# Patient Record
Sex: MALE | Race: WHITE | NOT HISPANIC OR LATINO | Employment: OTHER | URBAN - METROPOLITAN AREA
[De-identification: names, ages, dates, MRNs, and addresses within clinical notes are randomized per-mention and may not be internally consistent; named-entity substitution may affect disease eponyms.]

---

## 2017-04-10 ENCOUNTER — OFFICE VISIT (OUTPATIENT)
Dept: MEDICAL GROUP | Facility: MEDICAL CENTER | Age: 57
End: 2017-04-10
Payer: COMMERCIAL

## 2017-04-10 ENCOUNTER — HOSPITAL ENCOUNTER (OUTPATIENT)
Dept: RADIOLOGY | Facility: MEDICAL CENTER | Age: 57
End: 2017-04-10
Attending: NURSE PRACTITIONER
Payer: COMMERCIAL

## 2017-04-10 VITALS
HEART RATE: 75 BPM | OXYGEN SATURATION: 97 % | TEMPERATURE: 99.2 F | DIASTOLIC BLOOD PRESSURE: 70 MMHG | SYSTOLIC BLOOD PRESSURE: 114 MMHG | HEIGHT: 76 IN | RESPIRATION RATE: 16 BRPM | BODY MASS INDEX: 34.19 KG/M2 | WEIGHT: 280.8 LBS

## 2017-04-10 DIAGNOSIS — T14.8XXA NON-HEALING NON-SURGICAL WOUND: ICD-10-CM

## 2017-04-10 DIAGNOSIS — R22.2 MASS OF CHEST WALL: ICD-10-CM

## 2017-04-10 DIAGNOSIS — Z12.5 SCREENING FOR PROSTATE CANCER: ICD-10-CM

## 2017-04-10 DIAGNOSIS — Z12.11 SCREEN FOR COLON CANCER: ICD-10-CM

## 2017-04-10 DIAGNOSIS — R05.3 PERSISTENT COUGH: ICD-10-CM

## 2017-04-10 DIAGNOSIS — Z00.00 ROUTINE GENERAL MEDICAL EXAMINATION AT A HEALTH CARE FACILITY: ICD-10-CM

## 2017-04-10 PROCEDURE — 99213 OFFICE O/P EST LOW 20 MIN: CPT | Performed by: NURSE PRACTITIONER

## 2017-04-10 PROCEDURE — 71020 DX-CHEST-2 VIEWS: CPT

## 2017-04-10 ASSESSMENT — PATIENT HEALTH QUESTIONNAIRE - PHQ9: CLINICAL INTERPRETATION OF PHQ2 SCORE: 0

## 2017-04-10 NOTE — MR AVS SNAPSHOT
"        Jaden Cheung   4/10/2017 2:40 PM   Office Visit   MRN: [id number]    Department:  [address] [place] [address]   Dept Phone:  [phone]    Description:  Male : 1960   Provider:  BUDDY Mosquera           Reason for Visit     Sore on end of R. middle finger    Nodule on center of chest    Other \"constant clearing of throat, not drainage\"    Gland Swelling saliva gland swelling on L. side of face    Establish Care           Allergies as of 4/10/2017     No Known Allergies      You were diagnosed with     Non-healing non-surgical wound   [034393]       Mass of chest wall   [482401]       Persistent cough   [366222]       Routine general medical examination at a health care facility   [V70.0.ICD-9-CM]       Screen for colon cancer   [115119]       Screening for prostate cancer   [[id number]]         Vital Signs     Blood Pressure Pulse Temperature Respirations Height Weight    114/70 mmHg 75 37.3 °C (99.2 °F) 16 1.93 m (6' 3.98\") 127.37 kg (280 lb 12.8 oz)    Body Mass Index Oxygen Saturation Smoking Status             34.19 kg/m2 97% Never Smoker          Basic Information     Date Of Birth Sex Race Ethnicity Preferred Language    1960 Male White Non- English      Health Maintenance        Date Due Completion Dates    IMM DTaP/Tdap/Td Vaccine (1 - Tdap) 1978 ---    COLONOSCOPY 2009 ---            Current Immunizations     No immunizations on file.      Below and/or attached are the medications your provider expects you to take. Review all of your home medications and newly ordered medications with your provider and/or pharmacist. Follow medication instructions as directed by your provider and/or pharmacist. Please keep your medication list with you and share with your provider. Update the information when medications are discontinued, doses are changed, or new medications (including over-the-counter products) are added; and carry medication information at all times in the event " of emergency situations     Allergies:  No Known Allergies          Medications  Valid as of: April 10, 2017 -  4:06 PM    Generic Name Brand Name Tablet Size Instructions for use    Amoxicillin (Cap) AMOXIL 500 MG Take 1 Cap by mouth 3 times a day.        .                 Medicines prescribed today were sent to:     [facility] [address] [place] [address] [place], [place] - [address] AT [facility] [address]    [address] [facility] [place] [address]    Phone: [phone] Fax: [phone]    Open 24 Hours?: No      Medication refill instructions:       If your prescription bottle indicates you have medication refills left, it is not necessary to call your provider’s office. Please contact your pharmacy and they will refill your medication.    If your prescription bottle indicates you do not have any refills left, you may request refills at any time through one of the following ways: The online Wholeshare system (except Urgent Care), by calling your provider’s office, or by asking your pharmacy to contact your provider’s office with a refill request. Medication refills are processed only during regular business hours and may not be available until the next business day. Your provider may request additional information or to have a follow-up visit with you prior to refilling your medication.   *Please Note: Medication refills are assigned a new Rx number when refilled electronically. Your pharmacy may indicate that no refills were authorized even though a new prescription for the same medication is available at the pharmacy. Please request the medicine by name with the pharmacy before contacting your provider for a refill.        Your To Do List     Future Labs/Procedures Complete By Expires    COMP METABOLIC PANEL  As directed 4/11/2018    DX-CHEST-2 VIEWS  As directed 10/11/2017    LIPID PROFILE  As directed 4/11/2018    PROSTATE SPECIFIC AG SCREENING  As directed 4/11/2018    TSH  As directed 4/11/2018     US-CHEST  As directed 4/10/2018      Referral     A referral request has been sent to our patient care coordination department. Please allow 3-5 business days for us to process this request and contact you either by phone or mail. If you do not hear from us by the 5th business day, please call us at [phone].           ShowUhow Access Code: [email or web address]  Expires: 5/10/2017  3:12 PM    ShowUhow  A secure, online tool to manage your health information     [employer]’s ShowUhow® is a secure, online tool that connects you to your personalized health information from the privacy of your home -- day or night - making it very easy for you to manage your healthcare. Once the activation process is completed, you can even access your medical information using the ShowUhow evelyn, which is available for free in the Apple Evelyn store or Google Play store.     ShowUhow provides the following levels of access (as shown below):   My Chart Features   [facility] Primary Care Doctor [facility]  Specialists [facility]  [facility]  Primary Care  Doctor   Email your healthcare team securely and privately 24/7 X X X    Manage appointments: schedule your next appointment; view details of past/upcoming appointments X      Request prescription refills. X      View recent personal medical records, including lab and immunizations X X X X   View health record, including health history, allergies, medications X X X X   Read reports about your outpatient visits, procedures, consult and ER notes X X X X   See your discharge summary, which is a recap of your hospital and/or ER visit that includes your diagnosis, lab results, and care plan. X X       How to register for ShowUhow:  1. Go to  [email or web address].  2. Click on the Sign Up Now box, which takes you to the New Member Sign Up page. You will need to provide the following information:  a. Enter your ShowUhow Access Code exactly as it appears at the top of this page. (You will not  need to use this code after you’ve completed the sign-up process. If you do not sign up before the expiration date, you must request a new code.)   b. Enter your date of birth.   c. Enter your home email address.   d. Click Submit, and follow the next screen’s instructions.  3. Create a [employer] USt ID. This will be your [employer] USt login ID and cannot be changed, so think of one that is secure and easy to remember.  4. Create a SourceNinja password. You can change your password at any time.  5. Enter your Password Reset Question and Answer. This can be used at a later time if you forget your password.   6. Enter your e-mail address. This allows you to receive e-mail notifications when new information is available in SourceNinja.  7. Click Sign Up. You can now view your health information.    For assistance activating your SourceNinja account, call [phone]

## 2017-04-11 ASSESSMENT — ENCOUNTER SYMPTOMS: COUGH: 1

## 2017-04-11 NOTE — PROGRESS NOTES
Subjective:      Jaden Cheung is a 56 y.o. male who presents with Sore; Nodule; Other; Gland Swelling; and Establish Care            Other  Associated symptoms include coughing and a rash.   Jaden Cheung is here today to establish care and for a number of concerns.      1. Non-healing non-surgical wound  In June of last year patient states he noticed a swelling on the tip of his right third finger. He lanced it and he states a ball-like material came out of the area. He was seen at urgent care and placed on antibiotics. He states since then and continues to not heal. He reports that he went to [facility] emergency room for this and x-rays were taken and everything was fine. No medications were prescribed. He is wondering why this is not healed and almost a year. There is minimal pain and there is small amounts of discharge from the site.    2. Mass of chest wall  Patient has a cystlike area on his left chest wall which has been present for a while but he would like to find out what this is and whether it should be removed. There is no pain or redness in the area.    3. Persistent cough  Patient reports he is persistently clearing his throat and feels that he has much phlegm which comes from his lungs. He states this is been going on for quite a while. He does not think it is sinus related so has not tried steroid nasal sprays or antihistamines. He denies history of asthma or emphysema and does not smoke.    4. Routine general medical examination at a health care facility  Patient due for routine blood work.    5. Screen for colon cancer  Patient reports he has never had a colonoscopy.    6. Screening for prostate cancer  Patient does not remember when he had his last PSA.    Current Outpatient Prescriptions   Medication Sig Dispense Refill   • amoxicillin (AMOXIL) 500 MG Cap Take 1 Cap by mouth 3 times a day. (Patient not taking: Reported on 4/10/2017) 30 Cap 0     No current  "facility-administered medications for this visit.     Social History   Substance Use Topics   • Smoking status: Never Smoker    • Smokeless tobacco: Never Used   • Alcohol Use: No   History reviewed. No pertinent past medical history.   Family History   Problem Relation Age of Onset   • Dementia Mother    • Other Father      parkinsons       Review of Systems   Respiratory: Positive for cough.    Skin: Positive for rash.   All other systems reviewed and are negative.         Objective:     /70 mmHg  Pulse 75  Temp(Src) 37.3 °C (99.2 °F)  Resp 16  Ht 1.93 m (6' 3.98\")  Wt 127.37 kg (280 lb 12.8 oz)  BMI 34.19 kg/m2  SpO2 97%     Physical Exam   Constitutional: He is oriented to person, place, and time. He appears well-developed and well-nourished. No distress.   HENT:   Head: Normocephalic and atraumatic.   Right Ear: External ear normal.   Left Ear: External ear normal.   Nose: Nose normal.   Mouth/Throat: Oropharynx is clear and moist.   Eyes: Conjunctivae are normal. Right eye exhibits no discharge. Left eye exhibits no discharge.   Neck: Normal range of motion. Neck supple. No tracheal deviation present. No thyromegaly present.   Cardiovascular: Normal rate, regular rhythm and normal heart sounds.    No murmur heard.  Pulmonary/Chest: Effort normal and breath sounds normal. No respiratory distress. He has no wheezes. He has no rales.   No congestion of the lungs heard.   Lymphadenopathy:     He has no cervical adenopathy.   Neurological: He is alert and oriented to person, place, and time. Coordination normal.   Skin: Skin is warm and dry. Rash noted. He is not diaphoretic. No erythema.   There is a raised an open area on the tip of the right third finger with scant discharge. There is a nontender nodular area beneath the skin of the left chest wall.   Psychiatric: He has a normal mood and affect. His behavior is normal. Judgment and thought content normal.   Nursing note and vitals reviewed.       "        Assessment/Plan:     1. Non-healing non-surgical wound  I am not sure why this is not healing but it has been present long enough that it needs further evaluation. I have referred patient to plastic surgery. Patient states that if it takes a long time to get in to a specialist, he does have the option of going to [facility] in [place] with his insurance and I told him that would be fine as well but this should be checked out in the next month. He should avoid rubbing the area which prevents it from healing.  - REFERRAL TO PLASTIC SURGERY    2. Mass of chest wall  Most likely lipoma but I will send patient for ultrasound per his request and if it needs be removed will refer him to a surgeon.  - US-CHEST; Future    3. Persistent cough  Patient's chest x-ray comes back normal at the time of dictation and I advised patient at the time of this visit that if it was normal, he should consider postnasal drip as a cause of his symptoms. I recommended steroid nasal sprays and antihistamines. If it does not improve I recommended that we do pulmonary function studies.  - DX-CHEST-2 VIEWS; Future    4. Routine general medical examination at a health care facility  Patient will do lab work and follow up if anything is abnormal.  - COMP METABOLIC PANEL; Future  - LIPID PROFILE; Future  - TSH; Future    5. Screen for colon cancer    - REFERRAL TO GASTROENTEROLOGY    6. Screening for prostate cancer    - PROSTATE SPECIFIC AG SCREENING; Future

## 2017-05-10 ENCOUNTER — HOSPITAL ENCOUNTER (OUTPATIENT)
Dept: LAB | Facility: MEDICAL CENTER | Age: 57
End: 2017-05-10
Attending: DERMATOLOGY
Payer: COMMERCIAL

## 2017-05-10 LAB
ALBUMIN SERPL BCP-MCNC: 4.4 G/DL (ref 3.2–4.9)
ALBUMIN/GLOB SERPL: 1.6 G/DL
ALP SERPL-CCNC: 77 U/L (ref 30–99)
ALT SERPL-CCNC: 31 U/L (ref 2–50)
ANION GAP SERPL CALC-SCNC: 13 MMOL/L (ref 0–11.9)
AST SERPL-CCNC: 33 U/L (ref 12–45)
BASOPHILS # BLD AUTO: 1.2 % (ref 0–1.8)
BASOPHILS # BLD: 0.06 K/UL (ref 0–0.12)
BILIRUB SERPL-MCNC: 1.4 MG/DL (ref 0.1–1.5)
BUN SERPL-MCNC: 17 MG/DL (ref 8–22)
CALCIUM SERPL-MCNC: 9.2 MG/DL (ref 8.5–10.5)
CHLORIDE SERPL-SCNC: 107 MMOL/L (ref 96–112)
CO2 SERPL-SCNC: 19 MMOL/L (ref 20–33)
CREAT SERPL-MCNC: 1.01 MG/DL (ref 0.5–1.4)
EOSINOPHIL # BLD AUTO: 0.07 K/UL (ref 0–0.51)
EOSINOPHIL NFR BLD: 1.3 % (ref 0–6.9)
ERYTHROCYTE [DISTWIDTH] IN BLOOD BY AUTOMATED COUNT: 39.7 FL (ref 35.9–50)
GFR SERPL CREATININE-BSD FRML MDRD: >60 ML/MIN/1.73 M 2
GLOBULIN SER CALC-MCNC: 2.8 G/DL (ref 1.9–3.5)
GLUCOSE SERPL-MCNC: 121 MG/DL (ref 65–99)
HCT VFR BLD AUTO: 47.7 % (ref 42–52)
HGB BLD-MCNC: 16.2 G/DL (ref 14–18)
IMM GRANULOCYTES # BLD AUTO: 0.02 K/UL (ref 0–0.11)
IMM GRANULOCYTES NFR BLD AUTO: 0.4 % (ref 0–0.9)
LYMPHOCYTES # BLD AUTO: 1.27 K/UL (ref 1–4.8)
LYMPHOCYTES NFR BLD: 24.4 % (ref 22–41)
MCH RBC QN AUTO: 29.5 PG (ref 27–33)
MCHC RBC AUTO-ENTMCNC: 34 G/DL (ref 33.7–35.3)
MCV RBC AUTO: 86.7 FL (ref 81.4–97.8)
MONOCYTES # BLD AUTO: 0.39 K/UL (ref 0–0.85)
MONOCYTES NFR BLD AUTO: 7.5 % (ref 0–13.4)
NEUTROPHILS # BLD AUTO: 3.39 K/UL (ref 1.82–7.42)
NEUTROPHILS NFR BLD: 65.2 % (ref 44–72)
NRBC # BLD AUTO: 0 K/UL
NRBC BLD AUTO-RTO: 0 /100 WBC
PLATELET # BLD AUTO: 232 K/UL (ref 164–446)
PMV BLD AUTO: 9.6 FL (ref 9–12.9)
POTASSIUM SERPL-SCNC: 4.3 MMOL/L (ref 3.6–5.5)
PROT SERPL-MCNC: 7.2 G/DL (ref 6–8.2)
RBC # BLD AUTO: 5.5 M/UL (ref 4.7–6.1)
SODIUM SERPL-SCNC: 139 MMOL/L (ref 135–145)
WBC # BLD AUTO: 5.2 K/UL (ref 4.8–10.8)

## 2017-05-10 PROCEDURE — 80053 COMPREHEN METABOLIC PANEL: CPT

## 2017-05-10 PROCEDURE — 85025 COMPLETE CBC W/AUTO DIFF WBC: CPT

## 2017-05-10 PROCEDURE — 36415 COLL VENOUS BLD VENIPUNCTURE: CPT

## 2017-08-05 ENCOUNTER — OFFICE VISIT (OUTPATIENT)
Dept: URGENT CARE | Facility: PHYSICIAN GROUP | Age: 57
End: 2017-08-05
Payer: COMMERCIAL

## 2017-08-05 VITALS
DIASTOLIC BLOOD PRESSURE: 76 MMHG | OXYGEN SATURATION: 94 % | TEMPERATURE: 98 F | HEART RATE: 78 BPM | BODY MASS INDEX: 35.49 KG/M2 | HEIGHT: 75 IN | RESPIRATION RATE: 12 BRPM | WEIGHT: 285.4 LBS | SYSTOLIC BLOOD PRESSURE: 110 MMHG

## 2017-08-05 DIAGNOSIS — L08.9 FINGER INFECTION: ICD-10-CM

## 2017-08-05 PROCEDURE — 99214 OFFICE O/P EST MOD 30 MIN: CPT | Performed by: FAMILY MEDICINE

## 2017-08-05 RX ORDER — DOXYCYCLINE HYCLATE 100 MG
TABLET ORAL
Qty: 20 TAB | Refills: 0 | Status: SHIPPED | OUTPATIENT
Start: 2017-08-05 | End: 2017-10-03

## 2017-08-05 RX ORDER — DOXYCYCLINE HYCLATE 100 MG/1
CAPSULE ORAL
Refills: 0 | COMMUNITY
Start: 2017-05-16 | End: 2017-08-05

## 2017-08-05 RX ORDER — HYDROCODONE BITARTRATE AND ACETAMINOPHEN 10; 325 MG/1; MG/1
TABLET ORAL
COMMUNITY
Start: 2017-07-13 | End: 2017-08-05

## 2017-08-05 ASSESSMENT — PAIN SCALES - GENERAL: PAINLEVEL: NO PAIN

## 2017-08-05 NOTE — PROGRESS NOTES
Chief Complaint:    Chief Complaint   Patient presents with   • Finger Pain     x 2 days / RT mid finger / poss infeciton       History of Present Illness:    This is a new problem. Has redness and pain right 3rd finger. 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 Has sutures in this finger. Will see [facility] for follow-up and suture removal on 8/7/17. Recently the skin proximal to the nail is getting red and tender. There was mild drainage from suture region. Doxycycline has worked/been tolerated in past.      Review of Systems:    Constitutional: Negative for fever, chills, and diaphoresis.   Eyes: Negative for change in vision, photophobia, pain, redness, and discharge.  ENT: Negative for ear pain, ear discharge, hearing loss, tinnitus, nasal congestion, nosebleeds, and sore throat.    Respiratory: Negative for cough, hemoptysis, sputum production, shortness of breath, wheezing, and stridor.    Cardiovascular: Negative for chest pain, palpitations, orthopnea, claudication, leg swelling, and PND.   Gastrointestinal: Negative for abdominal pain, nausea, vomiting, diarrhea, constipation, blood in stool, and melena.   Genitourinary: Negative for dysuria, urinary urgency, urinary frequency, hematuria, and flank pain.   Musculoskeletal: See HPI.  Skin: See HPI.  Neurological: Negative for dizziness, tingling, tremors, sensory change, speech change, focal weakness, seizures, loss of consciousness, and headaches.   Endo: Negative for polydipsia.   Heme: Does not bruise/bleed easily.   Psychiatric/Behavioral: Negative for depression, suicidal ideas, hallucinations, memory loss and substance abuse. The patient is not nervous/anxious and does not have insomnia.      Past Medical History:    History reviewed. No pertinent past medical history.    Past Surgical History:    Past Surgical History   Procedure Laterality Date   • Ankle orif         Social History:    Social History     Social History   • Marital  "Status: Single     Spouse Name: N/A   • Number of Children: N/A   • Years of Education: N/A     Occupational History   • Not on file.     Social History Main Topics   • Smoking status: Never Smoker    • Smokeless tobacco: Never Used   • Alcohol Use: No   • Drug Use: No   • Sexual Activity:     Partners: Female     Other Topics Concern   • Not on file     Social History Narrative       Family History:    Family History   Problem Relation Age of Onset   • Dementia Mother    • Other Father      parkinsons       Medications:    No current outpatient prescriptions on file prior to visit.     No current facility-administered medications on file prior to visit.       Allergies:    No Known Allergies      Vitals:    Filed Vitals:    08/05/17 1309   BP: 110/76   Pulse: 78   Temp: 36.7 °C (98 °F)   Resp: 12   Height: 1.905 m (6' 3\")   Weight: 129.457 kg (285 lb 6.4 oz)   SpO2: 94%       Physical Exam:    Constitutional: Vital signs reviewed. Appears well-developed and well-nourished. No acute distress.   Eyes: Sclera white, conjunctivae clear.  ENT: External ears normal. Hearing normal.  Cardiovascular: Peripheral pulses 2+.   Pulmonary/Chest: Respirations non-labored.  Musculoskeletal: Normal gait. Normal range of motion. No muscular atrophy or weakness.  Neurological: Alert and oriented to person, place, and time. Muscle tone normal. Coordination normal. Light touch and sensation normal.  Skin: Right 3rd finger: proximal to nail there is 1 suture with surrounding erythema and tenderness of skin. No obvious pus seen or felt. Distal to nail there are multiple sutures without signs of infection.  Psychiatric: Normal mood and affect. Behavior is normal. Judgment and thought content normal.       Assessment / Plan:    1. Finger infection  - doxycycline (VIBRAMYCIN) 100 MG Tab; 1 TAB TWICE A DAY X 10 DAYS.  Dispense: 20 Tab; Refill: 0      Discussed with him DDX and management options.    Agreeable to medication prescribed.    He " will follow-up at [facility] as scheduled on 8/7/17.    Return to urgent care if getting worse before 8/7/17.

## 2017-08-05 NOTE — MR AVS SNAPSHOT
"        Jaden Cheung   2017 1:00 PM   Office Visit   MRN: [id number]    Department:  [facility]   Dept Phone:  [phone]    Description:  Male : 1960   Provider:  Eleazar Malave M.D.           Reason for Visit     Finger Pain x 2 days / RT mid finger / poss infeciton      Allergies as of 2017     No Known Allergies      You were diagnosed with     Finger infection   [183000]         Vital Signs     Blood Pressure Pulse Temperature Respirations Height Weight    110/76 mmHg 78 36.7 °C (98 °F) 12 1.905 m (6' 3\") 129.457 kg (285 lb 6.4 oz)    Body Mass Index Oxygen Saturation Smoking Status             35.67 kg/m2 94% Never Smoker          Basic Information     Date Of Birth Sex Race Ethnicity Preferred Language    1960 Male White Non- English      Health Maintenance        Date Due Completion Dates    IMM DTaP/Tdap/Td Vaccine (1 - Tdap) 1978 ---    COLONOSCOPY 2009 ---    IMM INFLUENZA (1) 2017 ---            Current Immunizations     No immunizations on file.      Below and/or attached are the medications your provider expects you to take. Review all of your home medications and newly ordered medications with your provider and/or pharmacist. Follow medication instructions as directed by your provider and/or pharmacist. Please keep your medication list with you and share with your provider. Update the information when medications are discontinued, doses are changed, or new medications (including over-the-counter products) are added; and carry medication information at all times in the event of emergency situations     Allergies:  No Known Allergies          Medications  Valid as of: 2017 -  1:37 PM    Generic Name Brand Name Tablet Size Instructions for use    Doxycycline Hyclate (Tab) VIBRAMYCIN 100 MG 1 TAB TWICE A DAY X 10 DAYS.        Mupirocin (Ointment) BACTROBAN 2 %         .                 Medicines prescribed today were sent to:    " [id number] - [place], [place] - [address] AT [facility] [address]    [address] [place] [address]    Phone: [phone] Fax: [phone]    Open 24 Hours?: No      Medication refill instructions:       If your prescription bottle indicates you have medication refills left, it is not necessary to call your provider’s office. Please contact your pharmacy and they will refill your medication.    If your prescription bottle indicates you do not have any refills left, you may request refills at any time through one of the following ways: The online Open Places system (except Urgent Care), by calling your provider’s office, or by asking your pharmacy to contact your provider’s office with a refill request. Medication refills are processed only during regular business hours and may not be available until the next business day. Your provider may request additional information or to have a follow-up visit with you prior to refilling your medication.   *Please Note: Medication refills are assigned a new Rx number when refilled electronically. Your pharmacy may indicate that no refills were authorized even though a new prescription for the same medication is available at the pharmacy. Please request the medicine by name with the pharmacy before contacting your provider for a refill.           Open Places Access Code: Activation code not generated  Current Open Places Status: Active

## 2017-10-03 ENCOUNTER — OFFICE VISIT (OUTPATIENT)
Dept: MEDICAL GROUP | Facility: MEDICAL CENTER | Age: 57
End: 2017-10-03
Payer: COMMERCIAL

## 2017-10-03 VITALS
DIASTOLIC BLOOD PRESSURE: 80 MMHG | BODY MASS INDEX: 34.83 KG/M2 | OXYGEN SATURATION: 99 % | RESPIRATION RATE: 16 BRPM | HEIGHT: 76 IN | WEIGHT: 286 LBS | TEMPERATURE: 98.7 F | SYSTOLIC BLOOD PRESSURE: 132 MMHG | HEART RATE: 77 BPM

## 2017-10-03 DIAGNOSIS — D49.2 NEOPLASM OF SKIN OF FINGER: ICD-10-CM

## 2017-10-03 DIAGNOSIS — Z00.00 ROUTINE GENERAL MEDICAL EXAMINATION AT A HEALTH CARE FACILITY: ICD-10-CM

## 2017-10-03 DIAGNOSIS — Z12.5 SCREENING FOR PROSTATE CANCER: ICD-10-CM

## 2017-10-03 DIAGNOSIS — K42.9 UMBILICAL HERNIA WITHOUT OBSTRUCTION AND WITHOUT GANGRENE: ICD-10-CM

## 2017-10-03 DIAGNOSIS — R73.01 IMPAIRED FASTING GLUCOSE: ICD-10-CM

## 2017-10-03 DIAGNOSIS — Z12.11 SCREEN FOR COLON CANCER: ICD-10-CM

## 2017-10-03 DIAGNOSIS — Z23 INFLUENZA VACCINE NEEDED: ICD-10-CM

## 2017-10-03 PROCEDURE — 99214 OFFICE O/P EST MOD 30 MIN: CPT | Mod: 25 | Performed by: NURSE PRACTITIONER

## 2017-10-03 PROCEDURE — 90471 IMMUNIZATION ADMIN: CPT | Performed by: NURSE PRACTITIONER

## 2017-10-03 PROCEDURE — 90686 IIV4 VACC NO PRSV 0.5 ML IM: CPT | Performed by: NURSE PRACTITIONER

## 2017-10-03 NOTE — PROGRESS NOTES
"Subjective:      Jaden Cheung is a 57 y.o. male who presents with Abdominal Pain (Belly button hernia)            HPI Jaden CheungIs here today for hernia concerns and need of lab work and immunizations and referrals.    1. Umbilical hernia without obstruction and without gangrene  Patient reports that for a few months he has noticed a gradually growing \"hernia\" in his umbilical area. He states it does not cause pain but there is sometimes discomfort. He also wants to have this repaired because he feels it keeps him from working out. He is able to push the bulging in the abdomen. There has been no redness or tenderness of the area.    2. Impaired fasting glucose  Patient has no history of diabetes but it appears he had some lab work in May which showed a mild elevation of blood sugar 121. He denies diabetic symptoms.    3. Neoplasm of skin of finger  When patient was seen for his initial visit in April of this year he had a odd, nonhealing growth at the tip of his right third finger. He apparently had had this for some time. 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 He states he has a follow-up with them next month and they're going to do an MRI of the area because the margins may not have been totally removed. He reports no pain of the area.    4. BMI 34.0-34.9,adult  Patient admits he has trouble losing weight and would like to talk to a dietitian.    5. Influenza vaccine needed  Patient due for this season's influenza vaccine.    6. Screening for prostate cancer  Patient did not do lab work I ordered earlier this year and I explained about yearly blood work.    7. Screen for colon cancer  Patient states he had what appears to be a CT of the abdomen while at [facility] but has not had a colonoscopy.    8. Routine general medical examination at a health care facility  Patient due for lab work.    No current outpatient " "prescriptions on file.     No current facility-administered medications for this visit.      Social History   Substance Use Topics   • Smoking status: Never Smoker   • Smokeless tobacco: Never Used   • Alcohol use No     Family History   Problem Relation Age of Onset   • Dementia Mother    • Other Father      parkinsons   No past medical history on file.    Review of Systems   All other systems reviewed and are negative.         Objective:     /80   Pulse 77   Temp 37.1 °C (98.7 °F)   Resp 16   Ht 1.93 m (6' 4\")   Wt (!) 129.7 kg (286 lb)   SpO2 99%   BMI 34.81 kg/m²      Physical Exam   Constitutional: He is oriented to person, place, and time. He appears well-developed and well-nourished. No distress.   HENT:   Head: Normocephalic and atraumatic.   Right Ear: External ear normal.   Left Ear: External ear normal.   Nose: Nose normal.   Mouth/Throat: Oropharynx is clear and moist.   Eyes: Conjunctivae are normal. Right eye exhibits no discharge. Left eye exhibits no discharge.   Neck: Normal range of motion. Neck supple. No tracheal deviation present. No thyromegaly present.   Cardiovascular: Normal rate, regular rhythm and normal heart sounds.    No murmur heard.  Pulmonary/Chest: Effort normal and breath sounds normal. No respiratory distress. He has no wheezes. He has no rales.   Abdominal:     Small bulging on the superior area of the umbilicus which is reducible and nontender. No erythema present.   Lymphadenopathy:     He has no cervical adenopathy.   Neurological: He is alert and oriented to person, place, and time. Coordination normal.   Skin: Skin is warm and dry. No rash noted. He is not diaphoretic. No erythema.   Psychiatric: He has a normal mood and affect. His behavior is normal. Judgment and thought content normal.   Nursing note and vitals reviewed.              Assessment/Plan:     1. Impaired fasting glucose  Patient at risk for diabetes with his age and obesity so he will do lab work " and then I will have him follow up if abnormal.  - COMP METABOLIC PANEL; Future  - HEMOGLOBIN A1C; Future    2. Umbilical hernia without obstruction and without gangrene  This area is small but is concerning for patient because it keeps him from working out and he believes it is getting bigger so he asked to see a surgeon to discuss treatment options. Advised him to prevent constipation and splint the area when coughing or sneezing.  - REFERRAL TO GENERAL SURGERY    3. Neoplasm of skin of finger  Patient will be following up next month at [facility] and I told him to be sure that they send copies of notes on their findings.    4. BMI 34.0-34.9,adult    - Patient identified as having weight management issue.  Appropriate orders and counseling given.  - REFERRAL TO [facility] IMPROVEMENT PROGRAMS (HIP) Services Requested: Weight Management Program; Reason for Visit: Overweight/Obesity; Future    5. Influenza vaccine needed  I have placed the below orders and discussed them with an approved delegating provider. The MA is performing the below orders under the direction of Dr. Preston    - Flu Quad Inj >3 Year Pre-Filled PF    6. Screening for prostate cancer    - PROSTATE SPECIFIC AG SCREENING; Future    7. Screen for colon cancer    - REFERRAL TO GASTROENTEROLOGY    8. Routine general medical examination at a health care facility    - LIPID PROFILE; Future  - PROSTATE SPECIFIC AG SCREENING; Future  - COMP METABOLIC PANEL; Future

## 2017-11-14 DIAGNOSIS — R73.01 IMPAIRED FASTING GLUCOSE: ICD-10-CM

## 2018-04-03 ENCOUNTER — OFFICE VISIT (OUTPATIENT)
Dept: URGENT CARE | Facility: PHYSICIAN GROUP | Age: 58
End: 2018-04-03
Payer: COMMERCIAL

## 2018-04-03 VITALS
WEIGHT: 286 LBS | DIASTOLIC BLOOD PRESSURE: 76 MMHG | TEMPERATURE: 98.4 F | HEART RATE: 84 BPM | SYSTOLIC BLOOD PRESSURE: 104 MMHG | RESPIRATION RATE: 16 BRPM | OXYGEN SATURATION: 95 % | HEIGHT: 76 IN | BODY MASS INDEX: 34.83 KG/M2

## 2018-04-03 DIAGNOSIS — T14.8XXA PUNCTURE WOUND: ICD-10-CM

## 2018-04-03 PROCEDURE — 90471 IMMUNIZATION ADMIN: CPT | Performed by: PHYSICIAN ASSISTANT

## 2018-04-03 PROCEDURE — 90715 TDAP VACCINE 7 YRS/> IM: CPT | Performed by: PHYSICIAN ASSISTANT

## 2018-04-03 PROCEDURE — 99214 OFFICE O/P EST MOD 30 MIN: CPT | Mod: 25 | Performed by: PHYSICIAN ASSISTANT

## 2018-04-05 ASSESSMENT — ENCOUNTER SYMPTOMS
CARDIOVASCULAR NEGATIVE: 1
RESPIRATORY NEGATIVE: 1
CONSTITUTIONAL NEGATIVE: 1
GASTROINTESTINAL NEGATIVE: 1

## 2018-04-05 NOTE — PROGRESS NOTES
"Subjective:      Jaden Cheung is a 57 y.o. male who presents with Foot Problem (Rt side-stepped on nail )            Puncture Wound    The incident occurred 1 to 3 hours ago. The laceration is located on the right foot. The laceration is 1 cm in size. The laceration mechanism was a metal edge (dirty nail). The pain is at a severity of 0/10. The patient is experiencing no pain. The pain has been constant since onset. He reports no foreign bodies present. His tetanus status is out of date.   Superficial puncture wound. Here for tetanus booster.    PMH:  has no past medical history on file.  MEDS: No current outpatient prescriptions on file.  ALLERGIES: No Known Allergies  SURGHX:   Past Surgical History:   Procedure Laterality Date   • ANKLE ORIF       SOCHX:  reports that he has never smoked. He has never used smokeless tobacco. He reports that he does not drink alcohol or use drugs.  FH: family history includes Dementia in his mother; Other in his father.      Review of Systems   Constitutional: Negative.    Respiratory: Negative.    Cardiovascular: Negative.    Gastrointestinal: Negative.        Medications, Allergies, and current problem list reviewed today in Epic     Objective:     /76   Pulse 84   Temp 36.9 °C (98.4 °F)   Resp 16   Ht 1.93 m (6' 4\")   Wt (!) 129.7 kg (286 lb)   SpO2 95%   BMI 34.81 kg/m²      Physical Exam   Constitutional: He is oriented to person, place, and time. He appears well-developed and well-nourished. No distress.   HENT:   Head: Normocephalic and atraumatic.   Eyes: Conjunctivae are normal.   Neck: Normal range of motion. Neck supple.   Cardiovascular: Normal rate, regular rhythm and normal heart sounds.    No murmur heard.  Pulmonary/Chest: Effort normal and breath sounds normal. No respiratory distress. He has no wheezes.   Neurological: He is alert and oriented to person, place, and time.   Skin: Skin is warm and dry. He is not diaphoretic.   Superficial " puncture wound plantar surface right foot. No surrounding erythema, discharge, tenderness. Range of motion normal.   Psychiatric: He has a normal mood and affect. His behavior is normal. Judgment and thought content normal.   Nursing note and vitals reviewed.              Assessment/Plan:     1. Puncture wound  Tdap =>8yo IM     Superficial puncture wound. Exam unremarkable.  Wound care discussed, watch out for infection  Tetanus booster  Return to clinic or go to ED if symptoms worsen or persist. Indications for ED discussed at length. Patient voices understanding. Follow-up with your primary care provider in 3-5 days. Red flags discussed. All side effects of medication discussed including allergic response, GI upset, tendon injury, etc.    Please note that this dictation was created using voice recognition software. I have made every reasonable attempt to correct obvious errors, but I expect that there are errors of grammar and possibly content that I did not discover before finalizing the note.

## 2018-08-04 ENCOUNTER — OFFICE VISIT (OUTPATIENT)
Dept: URGENT CARE | Facility: PHYSICIAN GROUP | Age: 58
End: 2018-08-04
Payer: COMMERCIAL

## 2018-08-04 VITALS
BODY MASS INDEX: 33.49 KG/M2 | TEMPERATURE: 97.6 F | HEART RATE: 66 BPM | OXYGEN SATURATION: 94 % | SYSTOLIC BLOOD PRESSURE: 110 MMHG | HEIGHT: 76 IN | DIASTOLIC BLOOD PRESSURE: 70 MMHG | WEIGHT: 275 LBS

## 2018-08-04 DIAGNOSIS — H61.23 IMPACTED CERUMEN OF BOTH EARS: ICD-10-CM

## 2018-08-04 PROCEDURE — 99202 OFFICE O/P NEW SF 15 MIN: CPT | Performed by: EMERGENCY MEDICINE

## 2018-08-04 RX ORDER — HYDROCODONE BITARTRATE AND ACETAMINOPHEN 10; 325 MG/1; MG/1
TABLET ORAL
COMMUNITY
Start: 2017-07-13 | End: 2019-01-27

## 2018-08-04 ASSESSMENT — ENCOUNTER SYMPTOMS
HEADACHES: 0
FEVER: 0
COUGH: 0
VOMITING: 0
SORE THROAT: 0
SWOLLEN GLANDS: 0
NAUSEA: 0

## 2018-08-04 NOTE — PROGRESS NOTES
"Subjective:      Jaden Cheung is a 58 y.o. male who presents with Cerumen Impaction (Pt presents w/ bilateral cerumen impaction.)            Cerumen Impaction   This is a recurrent problem. The current episode started in the past 7 days. The problem occurs daily. The problem has been waxing and waning. Pertinent negatives include no congestion, coughing, fever, headaches, nausea, rash, sore throat, swollen glands or vomiting. Nothing aggravates the symptoms. Treatments tried: earwax cleaning kit. The treatment provided no relief.       Review of Systems   Constitutional: Negative for fever.   HENT: Negative for congestion, ear discharge, ear pain, sore throat and tinnitus.    Respiratory: Negative for cough.    Gastrointestinal: Negative for nausea and vomiting.   Skin: Negative for itching and rash.   Neurological: Negative for headaches.   Endo/Heme/Allergies: Negative for environmental allergies.     PMH:  has no past medical history on file.  MEDS:   Current Outpatient Prescriptions:   •  HYDROcodone/acetaminophen (NORCO)  MG Tab, Take  by mouth., Disp: , Rfl:   ALLERGIES: No Known Allergies  SURGHX:   Past Surgical History:   Procedure Laterality Date   • ANKLE ORIF       SOCHX:  reports that he has never smoked. He has never used smokeless tobacco. He reports that he does not drink alcohol or use drugs.  FH: family history includes Dementia in his mother; Other in his father.       Objective:     /70   Pulse 66   Temp 36.4 °C (97.6 °F)   Ht 1.93 m (6' 4\")   Wt 124.7 kg (275 lb)   SpO2 94%   BMI 33.47 kg/m²      Physical Exam   Constitutional: Vital signs are normal. He appears well-developed and well-nourished. He is cooperative. He does not appear ill. No distress.   HENT:   Head: Normocephalic.   Right Ear: External ear normal. No drainage, swelling or tenderness. No mastoid tenderness. Decreased hearing is noted.   Left Ear: External ear normal. No drainage, swelling or tenderness. " No mastoid tenderness. Decreased hearing is noted.   Nose: Nose normal.   Bilateral impacted cerumen; aftercare lavage ear canals clear, tympanic membranes normal.   Eyes: Conjunctivae are normal.   Pulmonary/Chest: Effort normal.   Lymphadenopathy:        Head (right side): No preauricular and no posterior auricular adenopathy present.        Head (left side): No preauricular and no posterior auricular adenopathy present.     He has no cervical adenopathy.   Neurological: He is alert.   Skin: Skin is warm and dry. No rash noted.   Psychiatric: He has a normal mood and affect.               Assessment/Plan:     1. Impacted cerumen of both ears  Ear lavages

## 2018-08-04 NOTE — PATIENT INSTRUCTIONS
Earwax Buildup  Your ears make a substance called earwax. It may also be called cerumen. Sometimes, too much earwax builds up in your ear canal. This can cause ear pain and make it harder for you to hear.  CAUSES  This condition is caused by too much earwax production or buildup.  RISK FACTORS  The following factors may make you more likely to develop this condition:  · Cleaning your ears often with swabs.  · Having narrow ear canals.  · Having earwax that is overly thick or sticky.  · Having eczema.  · Being dehydrated.  SYMPTOMS  Symptoms of this condition include:  · Reduced hearing.  · Ear drainage.  · Ear pain.  · Ear itch.  · A feeling of fullness in the ear or feeling that the ear is plugged.  · Ringing in the ear.  · Coughing.  DIAGNOSIS  Your health care provider can diagnose this condition based on your symptoms and medical history. Your health care provider will also do an ear exam to look inside your ear with a scope (otoscope). You may also have a hearing test.  TREATMENT  Treatment for this condition includes:  · Over-the-counter or prescription ear drops to soften the earwax.  · Earwax removal by a health care provider. This may be done:  ¨ By flushing the ear with body-temperature water.  ¨ With a medical instrument that has a loop at the end (earwax curette).  ¨ With a suction device.  HOME CARE INSTRUCTIONS  · Take over-the-counter and prescription medicines only as told by your health care provider.  · Do not put any objects, including an ear swab, into your ear. You can clean the opening of your ear canal with a washcloth.  · Drink enough water to keep your urine clear or pale yellow.  · If you have frequent earwax buildup or you use hearing aids, consider seeing your health care provider every 6-12 months for routine preventive ear cleanings. Keep all follow-up visits as told by your health care provider.  SEEK MEDICAL CARE IF:  · You have ear pain.  · Your condition does not improve with  treatment.  · You have hearing loss.  · You have blood, pus, or other fluid coming from your ear.  This information is not intended to replace advice given to you by your health care provider. Make sure you discuss any questions you have with your health care provider.  Document Released: 01/25/2006 Document Revised: 04/10/2017 Document Reviewed: 08/03/2016  [employer] Interactive Patient Education © 2017 [employer]

## 2019-01-27 ENCOUNTER — APPOINTMENT (OUTPATIENT)
Dept: RADIOLOGY | Facility: MEDICAL CENTER | Age: 59
End: 2019-01-27
Attending: EMERGENCY MEDICINE
Payer: COMMERCIAL

## 2019-01-27 ENCOUNTER — APPOINTMENT (OUTPATIENT)
Dept: RADIOLOGY | Facility: MEDICAL CENTER | Age: 59
End: 2019-01-27
Attending: FAMILY MEDICINE
Payer: COMMERCIAL

## 2019-01-27 ENCOUNTER — HOSPITAL ENCOUNTER (OUTPATIENT)
Facility: MEDICAL CENTER | Age: 59
End: 2019-01-27
Attending: EMERGENCY MEDICINE | Admitting: FAMILY MEDICINE
Payer: COMMERCIAL

## 2019-01-27 ENCOUNTER — APPOINTMENT (OUTPATIENT)
Dept: CARDIOLOGY | Facility: MEDICAL CENTER | Age: 59
End: 2019-01-27
Attending: FAMILY MEDICINE
Payer: COMMERCIAL

## 2019-01-27 VITALS
TEMPERATURE: 97.6 F | OXYGEN SATURATION: 94 % | BODY MASS INDEX: 33.75 KG/M2 | WEIGHT: 277.12 LBS | DIASTOLIC BLOOD PRESSURE: 67 MMHG | HEIGHT: 76 IN | SYSTOLIC BLOOD PRESSURE: 112 MMHG | HEART RATE: 63 BPM | RESPIRATION RATE: 14 BRPM

## 2019-01-27 DIAGNOSIS — R07.89 ATYPICAL CHEST PAIN: ICD-10-CM

## 2019-01-27 PROBLEM — E78.5 DYSLIPIDEMIA: Status: ACTIVE | Noted: 2019-01-27

## 2019-01-27 PROBLEM — R07.9 CHEST PAIN: Status: ACTIVE | Noted: 2019-01-27

## 2019-01-27 LAB
ALBUMIN SERPL BCP-MCNC: 4.3 G/DL (ref 3.2–4.9)
ALBUMIN/GLOB SERPL: 1.7 G/DL
ALP SERPL-CCNC: 64 U/L (ref 30–99)
ALT SERPL-CCNC: 22 U/L (ref 2–50)
ANION GAP SERPL CALC-SCNC: 7 MMOL/L (ref 0–11.9)
AST SERPL-CCNC: 12 U/L (ref 12–45)
BASOPHILS # BLD AUTO: 1.4 % (ref 0–1.8)
BASOPHILS # BLD: 0.08 K/UL (ref 0–0.12)
BILIRUB SERPL-MCNC: 0.8 MG/DL (ref 0.1–1.5)
BUN SERPL-MCNC: 13 MG/DL (ref 8–22)
CALCIUM SERPL-MCNC: 9 MG/DL (ref 8.5–10.5)
CHLORIDE SERPL-SCNC: 107 MMOL/L (ref 96–112)
CHOLEST SERPL-MCNC: 194 MG/DL (ref 100–199)
CO2 SERPL-SCNC: 25 MMOL/L (ref 20–33)
CREAT SERPL-MCNC: 0.93 MG/DL (ref 0.5–1.4)
D DIMER PPP IA.FEU-MCNC: <0.4 UG/ML (FEU) (ref 0–0.5)
EKG IMPRESSION: NORMAL
EOSINOPHIL # BLD AUTO: 0.14 K/UL (ref 0–0.51)
EOSINOPHIL NFR BLD: 2.4 % (ref 0–6.9)
ERYTHROCYTE [DISTWIDTH] IN BLOOD BY AUTOMATED COUNT: 40.5 FL (ref 35.9–50)
EST. AVERAGE GLUCOSE BLD GHB EST-MCNC: 114 MG/DL
GLOBULIN SER CALC-MCNC: 2.5 G/DL (ref 1.9–3.5)
GLUCOSE SERPL-MCNC: 115 MG/DL (ref 65–99)
HBA1C MFR BLD: 5.6 % (ref 0–5.6)
HCT VFR BLD AUTO: 46.6 % (ref 42–52)
HDLC SERPL-MCNC: 49 MG/DL
HGB BLD-MCNC: 15.8 G/DL (ref 14–18)
IMM GRANULOCYTES # BLD AUTO: 0.02 K/UL (ref 0–0.11)
IMM GRANULOCYTES NFR BLD AUTO: 0.3 % (ref 0–0.9)
LDLC SERPL CALC-MCNC: 129 MG/DL
LIPASE SERPL-CCNC: 24 U/L (ref 11–82)
LV EJECT FRACT  99904: 55
LYMPHOCYTES # BLD AUTO: 2.19 K/UL (ref 1–4.8)
LYMPHOCYTES NFR BLD: 37.8 % (ref 22–41)
MCH RBC QN AUTO: 29.9 PG (ref 27–33)
MCHC RBC AUTO-ENTMCNC: 33.9 G/DL (ref 33.7–35.3)
MCV RBC AUTO: 88.3 FL (ref 81.4–97.8)
MONOCYTES # BLD AUTO: 0.46 K/UL (ref 0–0.85)
MONOCYTES NFR BLD AUTO: 7.9 % (ref 0–13.4)
NEUTROPHILS # BLD AUTO: 2.9 K/UL (ref 1.82–7.42)
NEUTROPHILS NFR BLD: 50.2 % (ref 44–72)
NRBC # BLD AUTO: 0 K/UL
NRBC BLD-RTO: 0 /100 WBC
PLATELET # BLD AUTO: 195 K/UL (ref 164–446)
PMV BLD AUTO: 8.9 FL (ref 9–12.9)
POTASSIUM SERPL-SCNC: 4.2 MMOL/L (ref 3.6–5.5)
PROT SERPL-MCNC: 6.8 G/DL (ref 6–8.2)
RBC # BLD AUTO: 5.28 M/UL (ref 4.7–6.1)
SODIUM SERPL-SCNC: 139 MMOL/L (ref 135–145)
TRIGL SERPL-MCNC: 81 MG/DL (ref 0–149)
TROPONIN I SERPL-MCNC: <0.01 NG/ML (ref 0–0.04)
TROPONIN I SERPL-MCNC: <0.01 NG/ML (ref 0–0.04)
TSH SERPL DL<=0.005 MIU/L-ACNC: 2.04 UIU/ML (ref 0.38–5.33)
WBC # BLD AUTO: 5.8 K/UL (ref 4.8–10.8)

## 2019-01-27 PROCEDURE — 700111 HCHG RX REV CODE 636 W/ 250 OVERRIDE (IP)

## 2019-01-27 PROCEDURE — 700102 HCHG RX REV CODE 250 W/ 637 OVERRIDE(OP): Performed by: FAMILY MEDICINE

## 2019-01-27 PROCEDURE — G0378 HOSPITAL OBSERVATION PER HR: HCPCS

## 2019-01-27 PROCEDURE — 84484 ASSAY OF TROPONIN QUANT: CPT

## 2019-01-27 PROCEDURE — 83690 ASSAY OF LIPASE: CPT

## 2019-01-27 PROCEDURE — 80061 LIPID PANEL: CPT

## 2019-01-27 PROCEDURE — 94760 N-INVAS EAR/PLS OXIMETRY 1: CPT

## 2019-01-27 PROCEDURE — 96372 THER/PROPH/DIAG INJ SC/IM: CPT

## 2019-01-27 PROCEDURE — 93005 ELECTROCARDIOGRAM TRACING: CPT

## 2019-01-27 PROCEDURE — 83036 HEMOGLOBIN GLYCOSYLATED A1C: CPT

## 2019-01-27 PROCEDURE — 85025 COMPLETE CBC W/AUTO DIFF WBC: CPT

## 2019-01-27 PROCEDURE — 84443 ASSAY THYROID STIM HORMONE: CPT

## 2019-01-27 PROCEDURE — 71045 X-RAY EXAM CHEST 1 VIEW: CPT

## 2019-01-27 PROCEDURE — A9270 NON-COVERED ITEM OR SERVICE: HCPCS | Performed by: FAMILY MEDICINE

## 2019-01-27 PROCEDURE — 99236 HOSP IP/OBS SAME DATE HI 85: CPT | Performed by: FAMILY MEDICINE

## 2019-01-27 PROCEDURE — 36415 COLL VENOUS BLD VENIPUNCTURE: CPT

## 2019-01-27 PROCEDURE — 93005 ELECTROCARDIOGRAM TRACING: CPT | Performed by: EMERGENCY MEDICINE

## 2019-01-27 PROCEDURE — 93306 TTE W/DOPPLER COMPLETE: CPT | Mod: 26 | Performed by: INTERNAL MEDICINE

## 2019-01-27 PROCEDURE — 99285 EMERGENCY DEPT VISIT HI MDM: CPT

## 2019-01-27 PROCEDURE — 80053 COMPREHEN METABOLIC PANEL: CPT

## 2019-01-27 PROCEDURE — A9502 TC99M TETROFOSMIN: HCPCS

## 2019-01-27 PROCEDURE — 700111 HCHG RX REV CODE 636 W/ 250 OVERRIDE (IP): Performed by: FAMILY MEDICINE

## 2019-01-27 PROCEDURE — 85379 FIBRIN DEGRADATION QUANT: CPT

## 2019-01-27 PROCEDURE — 93306 TTE W/DOPPLER COMPLETE: CPT

## 2019-01-27 RX ORDER — REGADENOSON 0.08 MG/ML
INJECTION, SOLUTION INTRAVENOUS
Status: COMPLETED
Start: 2019-01-27 | End: 2019-01-27

## 2019-01-27 RX ORDER — OXYCODONE HYDROCHLORIDE 5 MG/1
5 TABLET ORAL
Status: DISCONTINUED | OUTPATIENT
Start: 2019-01-27 | End: 2019-01-27 | Stop reason: HOSPADM

## 2019-01-27 RX ORDER — BISACODYL 10 MG
10 SUPPOSITORY, RECTAL RECTAL
Status: DISCONTINUED | OUTPATIENT
Start: 2019-01-27 | End: 2019-01-27 | Stop reason: HOSPADM

## 2019-01-27 RX ORDER — ACETAMINOPHEN 325 MG/1
650 TABLET ORAL EVERY 6 HOURS PRN
Status: DISCONTINUED | OUTPATIENT
Start: 2019-01-27 | End: 2019-01-27 | Stop reason: HOSPADM

## 2019-01-27 RX ORDER — OXYCODONE HYDROCHLORIDE 5 MG/1
2.5 TABLET ORAL
Status: DISCONTINUED | OUTPATIENT
Start: 2019-01-27 | End: 2019-01-27 | Stop reason: HOSPADM

## 2019-01-27 RX ORDER — HYDRALAZINE HYDROCHLORIDE 20 MG/ML
10 INJECTION INTRAMUSCULAR; INTRAVENOUS EVERY 4 HOURS PRN
Status: DISCONTINUED | OUTPATIENT
Start: 2019-01-27 | End: 2019-01-27 | Stop reason: HOSPADM

## 2019-01-27 RX ORDER — ASPIRIN 325 MG
325 TABLET ORAL DAILY
Status: DISCONTINUED | OUTPATIENT
Start: 2019-01-27 | End: 2019-01-27 | Stop reason: HOSPADM

## 2019-01-27 RX ORDER — AMOXICILLIN 250 MG
2 CAPSULE ORAL 2 TIMES DAILY
Status: DISCONTINUED | OUTPATIENT
Start: 2019-01-27 | End: 2019-01-27 | Stop reason: HOSPADM

## 2019-01-27 RX ORDER — MORPHINE SULFATE 4 MG/ML
2 INJECTION, SOLUTION INTRAMUSCULAR; INTRAVENOUS
Status: DISCONTINUED | OUTPATIENT
Start: 2019-01-27 | End: 2019-01-27 | Stop reason: HOSPADM

## 2019-01-27 RX ORDER — POLYETHYLENE GLYCOL 3350 17 G/17G
1 POWDER, FOR SOLUTION ORAL
Status: DISCONTINUED | OUTPATIENT
Start: 2019-01-27 | End: 2019-01-27 | Stop reason: HOSPADM

## 2019-01-27 RX ADMIN — ENOXAPARIN SODIUM 40 MG: 100 INJECTION SUBCUTANEOUS at 10:30

## 2019-01-27 RX ADMIN — REGADENOSON 0.4 MG: 0.08 INJECTION, SOLUTION INTRAVENOUS at 14:09

## 2019-01-27 RX ADMIN — ASPIRIN 325 MG: 325 TABLET ORAL at 10:30

## 2019-01-27 ASSESSMENT — ENCOUNTER SYMPTOMS
ORTHOPNEA: 0
SHORTNESS OF BREATH: 0
HEARTBURN: 0
DIZZINESS: 0
WHEEZING: 0
NAUSEA: 0
DIARRHEA: 0
FEVER: 0
WEAKNESS: 0
HEADACHES: 0
BLURRED VISION: 0
PALPITATIONS: 0
COUGH: 0
PND: 0
NERVOUS/ANXIOUS: 0
SORE THROAT: 0
VOMITING: 0
CHILLS: 0
BACK PAIN: 0
ABDOMINAL PAIN: 0
NECK PAIN: 0

## 2019-01-27 ASSESSMENT — PATIENT HEALTH QUESTIONNAIRE - PHQ9
1. LITTLE INTEREST OR PLEASURE IN DOING THINGS: NOT AT ALL
2. FEELING DOWN, DEPRESSED, IRRITABLE, OR HOPELESS: NOT AT ALL
SUM OF ALL RESPONSES TO PHQ9 QUESTIONS 1 AND 2: 0

## 2019-01-27 ASSESSMENT — LIFESTYLE VARIABLES
ALCOHOL_USE: NO
DO YOU DRINK ALCOHOL: NO
EVER_SMOKED: NEVER

## 2019-01-27 NOTE — ED PROVIDER NOTES
"ED Provider Note    Scribed for Rene Fox M.D. by Ora Swartz. 1/27/2019  7:18 AM    Primary care provider: BUDDY Mosquera  Means of arrival: Walk-In  History obtained from: Patient  History limited by: None    CHIEF COMPLAINT  Chief Complaint   Patient presents with   • Chest Pain     Sharp, throbbing, to L side of chest. Woke him up from sleep. Had a CT a few months ago \"and they found something but they don't know what it is. Maybe an old, silent heart attack.\"      HPI  Jaden Cheung is a 58 y.o. male who presents to the Emergency Department complaining of intermittent mild left sided chest pain described as \"tightness\" lasting seconds for the past few months however progressively worsening over the past 3 days. He reports 1/10 chest pain currently. He was seen at [facility] for these symptoms and had a CT scan a couple months ago with some concerning findings. He was instructed to follow up on these findings and see a cardiologist however never did. Additionally, he mentions more stress and decreased sleep over the past 6 months. Denies shortness of breath, emesis, abdominal pain, leg swelling. Denies any pertinent past medical history and taking any daily medications.     REVIEW OF SYSTEMS  Pertinent positives include chest pain.   Pertinent negatives include no shortness of breath, abdominal pain, leg swelling, emesis.    All other systems reviewed and negative. See HPI for further details.     PAST MEDICAL HISTORY   Patient has a past medical history of digit papillary cancer.   Denies any previous cardiac history   Denies any medical history of hypertension.     SURGICAL HISTORY   has a past surgical history that includes ankle orif.    SOCIAL HISTORY  Social History   Substance Use Topics   • Smoking status: Never Smoker   • Smokeless tobacco: Never Used   • Alcohol use No      History   Drug Use No       FAMILY HISTORY  Family History   Problem Relation Age of Onset   • " "Dementia Mother    • Other Father         parkinsons       CURRENT MEDICATIONS  No current facility-administered medications for this encounter.     Current Outpatient Prescriptions:   •  HYDROcodone/acetaminophen (NORCO)  MG Tab, Take  by mouth., Disp: , Rfl:     ALLERGIES  No Known Allergies    PHYSICAL EXAM  VITAL SIGNS: /90   Pulse 64   Temp 37 °C (98.6 °F) (Temporal)   Resp 16   Ht 1.93 m (6' 4\")   Wt (!) 126.5 kg (278 lb 14.1 oz)   SpO2 96%   BMI 33.95 kg/m²     Nursing note and vitals reviewed.  Constitutional: Well-developed and well-nourished. No distress.   HENT: Head is normocephalic and atraumatic. Oropharynx is clear and moist without exudate or erythema.   Eyes: Pupils are equal, round, and reactive to light. Conjunctiva are normal.   Cardiovascular: Normal rate and regular rhythm. No murmur heard. Normal radial pulses.   Pulmonary/Chest: Breath sounds normal. No wheezes or rales. No chest wall tenderness.   Abdominal: Obese. Soft and non-tender. No distention   Musculoskeletal: Extremities exhibit normal range of motion without edema or tenderness. No calf tenderness or palpable cords.   Neurological: Awake, alert and oriented to person, place, and time. No focal deficits noted.  Skin: Skin is warm and dry. No rash.   Psychiatric: Normal mood and affect. Appropriate for clinical situation      DIAGNOSTIC STUDIES / PROCEDURES    EKG Interpretation  Results for orders placed or performed during the hospital encounter of 19   EKG   Result Value Ref Range    Report       [facility] Emergency Dept.    Test Date:  2019  Pt Name:    NATALIE SANDERSON                Department: ER  MRN:        [id number]                      Room:  Gender:     Male                         Technician: [id number]  :        1960                   Requested By:ER TRIAGE PROTOCOL  Order #:    [id number]                    Reading MD: ELIZABETH HULL MD    Measurements  Intervals      "                           Axis  Rate:       67                           P:  IN:                                      QRS:        7  QRSD:       106                          T:          10  QT:         380  QTc:        401    Interpretive Statements  SINUS RHYTHM  LOW VOLTAGE IN FRONTAL LEADS  LEAD(S) aVF WERE NOT USED FOR MORPHOLOGY ANALYSIS  No previous ECG available for comparison    Electronically Signed On 1- 7:19:32 PST by ELIZABETH HULL MD       EKG signed by me as shown above.     LABS  Labs Reviewed   CBC WITH DIFFERENTIAL - Abnormal; Notable for the following:        Result Value    MPV 8.9 (*)     All other components within normal limits   COMP METABOLIC PANEL - Abnormal; Notable for the following:     Glucose 115 (*)     All other components within normal limits   TROPONIN   LIPASE   ESTIMATED GFR     All labs reviewed by me.    RADIOLOGY  DX-CHEST-PORTABLE (1 VIEW)   Final Result      1.  There is no acute cardiopulmonary process.        The radiologist's interpretation of all radiological studies have been reviewed by me.    COURSE & MEDICAL DECISION MAKING  Nursing notes, VS, PMSFHx reviewed in chart.     Review of past medical records shows the patient has no prior ER visits.      7:18 AM - Patient seen and examined at bedside. Reassured the patietn that his EKG was normal and unremarkable. Ordered DX- chest, CBC with differential, CMP, troponin STAT, lipase, and EKG to evaluate his symptoms. The differential diagnoses include but are not limited to: ACS, pneumonia, chest wall pain, GERD.    7:40 AM - Attempted to get results from [facility] however informed that they are not open until Monday.     Troponin is not elevated.  EKG is not diagnostic.  I feel the patient would benefit from further evaluation and risk stratification.    8:04 AM - Consulted with Dr. Herrera (Hospitalist) who accepts the patient for admission.       DISPOSITION:  Patient will be admitted to Dr. Herrera  (Hospitalist) in stable condition.      FINAL IMPRESSION  1. Atypical chest pain          Ora MCCONNELL (Scribe), am scribing for, and in the presence of, Rene Fox M.D..    Electronically signed by: Ora Swartz (Scribe), 1/27/2019    Rene MCCONNELL M.D. personally performed the services described in this documentation, as scribed by Ora Swartz in my presence, and it is both accurate and complete. C    The note accurately reflects work and decisions made by me.  Rene Fox  1/27/2019  11:13 AM

## 2019-01-27 NOTE — ED TRIAGE NOTES
"Chief Complaint   Patient presents with   • Chest Pain     Sharp, throbbing, to L side of chest. Woke him up from sleep. Had a CT a few months ago \"and they found something but they don't know what it is. Maybe an old, silent heart attack.\"      /90   Pulse 64   Temp 37 °C (98.6 °F) (Temporal)   Resp 16   Ht 1.93 m (6' 4\")   Wt (!) 126.5 kg (278 lb 14.1 oz)   SpO2 96%   BMI 33.95 kg/m²     Pt ambulatory to triage for above, steady on feet. EKG completed prior to triage. Denies N/V or numbness tingling. States pain has resolved at this time \"but sometimes it'll zing and come back.\" Pt does not appear in distress. Pt returned to [facility], instructed to notify staff of worsening concerns.   "

## 2019-01-27 NOTE — ED NOTES
Med rec complete per pt at bedside with family   Pt denies taking medications   NKDA  No oral ABX within last 30 days

## 2019-01-27 NOTE — H&P
Hospital Medicine History & Physical Note    Date of Service  1/27/2019    Primary Care Physician  BUDDY Mosquera    Consultants  None    Code Status  Full    Chief Complaint  Chest pain    History of Presenting Illness  58 y.o. male who presented 1/27/2019 with chest pain, patient states is been going on for about a year, intermittent, usually occurring weekly with lasts for only a few minutes, no associated symptoms of diaphoresis, shortness of breath, or palpitations, or dizziness or lightheadedness.  Over the past 2 weeks she has noticed to become become more frequent and are now occurring every few days.  This recent episode of pain occurred in the middle the night, patient states he was still awake then.  His initial EKG and troponins are negative.  He has some history of skin cancer in the digits of his hand, which has been removed, he apparently undergoes a PET scan regularly and his most recent scan was 3 months ago where there was a comment that he may have some coronary calcification was recommended to see a cardiologist.    Review of Systems  Review of Systems   Constitutional: Negative for chills, fever and malaise/fatigue.   HENT: Negative for hearing loss and sore throat.    Eyes: Negative for blurred vision.   Respiratory: Negative for cough, shortness of breath and wheezing.    Cardiovascular: Positive for chest pain. Negative for palpitations, orthopnea, leg swelling and PND.   Gastrointestinal: Negative for abdominal pain, diarrhea, heartburn, nausea and vomiting.   Genitourinary: Negative for dysuria.   Musculoskeletal: Negative for back pain and neck pain.   Skin: Negative for rash.   Neurological: Negative for dizziness, weakness and headaches.   Psychiatric/Behavioral: The patient is not nervous/anxious.        Past Medical History   has a past medical history of Skin cancer.    Surgical History   has a past surgical history that includes ankle orif.     Family History  family history  includes Dementia in his mother; Other in his father.     Social History   reports that he has never smoked. He has never used smokeless tobacco. He reports that he does not drink alcohol or use drugs.    Allergies  No Known Allergies    Medications  None       Physical Exam  Temp:  [36.3 °C (97.4 °F)-37 °C (98.6 °F)] 36.3 °C (97.4 °F)  Pulse:  [61-64] 61  Resp:  [11-16] 11  BP: (114-151)/(68-90) 114/68  SpO2:  [94 %-96 %] 94 %    Physical Exam   Constitutional: He is oriented to person, place, and time. He appears well-developed.   HENT:   Head: Normocephalic and atraumatic.   Eyes: Pupils are equal, round, and reactive to light. Conjunctivae are normal.   Neck: No tracheal deviation present. No thyromegaly present.   Cardiovascular: Normal rate and regular rhythm.    Pulmonary/Chest: Effort normal and breath sounds normal.   Abdominal: Soft. Bowel sounds are normal. He exhibits no distension. There is no tenderness.   Musculoskeletal: He exhibits no edema.   Lymphadenopathy:     He has no cervical adenopathy.   Neurological: He is alert and oriented to person, place, and time.   Skin: Skin is warm and dry.   Nursing note and vitals reviewed.      Laboratory:  Recent Labs      01/27/19   0723   WBC  5.8   RBC  5.28   HEMOGLOBIN  15.8   HEMATOCRIT  46.6   MCV  88.3   MCH  29.9   MCHC  33.9   RDW  40.5   PLATELETCT  195   MPV  8.9*     Recent Labs      01/27/19   0723   SODIUM  139   POTASSIUM  4.2   CHLORIDE  107   CO2  25   GLUCOSE  115*   BUN  13   CREATININE  0.93   CALCIUM  9.0     Recent Labs      01/27/19   0723   ALTSGPT  22   ASTSGOT  12   ALKPHOSPHAT  64   TBILIRUBIN  0.8   LIPASE  24   GLUCOSE  115*             Recent Labs      01/27/19   0723   TRIGLYCERIDE  81   HDL  49   LDL  129*     Recent Labs      01/27/19   0723   TROPONINI  <0.01       Urinalysis:    No results found     Imaging:  DX-CHEST-PORTABLE (1 VIEW)   Final Result      1.  There is no acute cardiopulmonary process.      EC-ECHOCARDIOGRAM  COMPLETE W/O CONT    (Results Pending)   NM-CARDIAC STRESS TEST    (Results Pending)         Assessment/Plan:  I anticipate this patient is appropriate for observation status at this time.    * Chest pain- (present on admission)   Assessment & Plan    ASA  Serial troponin, check lipid profile  Check stress test and echocardiogram     Impaired fasting glucose- (present on admission)   Assessment & Plan    Check hemoglobin A1c     BMI 34.0-34.9,adult- (present on admission)   Assessment & Plan    Recommend outpatient weight management program         VTE prophylaxis: Lovenox

## 2019-01-27 NOTE — CARE PLAN
Problem: Communication  Goal: The ability to communicate needs accurately and effectively will improve  Outcome: PROGRESSING AS EXPECTED  White board updated and plan of care discussed with patient.    Problem: Safety  Goal: Will remain free from falls  Outcome: PROGRESSING AS EXPECTED  Call light education provide, patient completed return demonstration successfully. Re-enforced use of call light to ensure patient safety and decrease risk of fall.

## 2019-01-28 NOTE — DISCHARGE SUMMARY
"Discharge Summary    CHIEF COMPLAINT ON ADMISSION  Chief Complaint   Patient presents with   • Chest Pain     Sharp, throbbing, to L side of chest. Woke him up from sleep. Had a CT a few months ago \"and they found something but they don't know what it is. Maybe an old, silent heart attack.\"        Reason for Admission  Chest Pain     Admission Date  1/27/2019    CODE STATUS  Full Code    HPI & HOSPITAL COURSE  This is a 58 y.o. male here with chest pain, patient states is been going on for about a year, intermittent, usually occurring weekly with lasts for only a few minutes, no associated symptoms of diaphoresis, shortness of breath, or palpitations, or dizziness or lightheadedness.  Over the past 2 weeks she has noticed to become become more frequent and are now occurring every few days.  This recent episode of pain occurred in the middle the night, patient states he was still awake then.  His initial EKG and troponins are negative.  He has some history of skin cancer in the digits of his hand, which has been removed, he apparently undergoes a PET scan regularly and his most recent scan was 3 months ago where there was a comment that he may have some coronary calcification was recommended to see a cardiologist.  Patient underwent EKG, serial troponins which were noted to be negative.  He underwent stress test and echocardiogram which were also noted to be negative.  His chest pain resolved.  We discussed his obesity, dyslipidemia, and the need to lose weight.  He is motivated to go on lifestyle modification and outpatient weight management program and feels that he would be able to achieve this.       Therefore, he is discharged in good and stable condition to home with close outpatient follow-up.    The patient recovered much more quickly than anticipated on admission.    Discharge Date  1/27/2019     FOLLOW UP ITEMS POST DISCHARGE  None    DISCHARGE DIAGNOSES  Principal Problem:    Chest pain POA: Yes  Active " Problems:    Impaired fasting glucose POA: Yes    Dyslipidemia POA: Yes    BMI 34.0-34.9,adult POA: Yes  Resolved Problems:    * No resolved hospital problems. *      FOLLOW UP  No future appointments.  No follow-up provider specified.    MEDICATIONS ON DISCHARGE     Medication List      You have not been prescribed any medications.         Allergies  No Known Allergies    DIET  Orders Placed This Encounter   Procedures   • Diet NPO     Standing Status:   Standing     Number of Occurrences:   1     Order Specific Question:   Restrict to:     Answer:   Sips with Medications [3]       ACTIVITY  As tolerated.  Weight bearing as tolerated    CONSULTATIONS  None    PROCEDURES  None    LABORATORY  Lab Results   Component Value Date    SODIUM 139 01/27/2019    POTASSIUM 4.2 01/27/2019    CHLORIDE 107 01/27/2019    CO2 25 01/27/2019    GLUCOSE 115 (H) 01/27/2019    BUN 13 01/27/2019    CREATININE 0.93 01/27/2019        Lab Results   Component Value Date    WBC 5.8 01/27/2019    HEMOGLOBIN 15.8 01/27/2019    HEMATOCRIT 46.6 01/27/2019    PLATELETCT 195 01/27/2019        Total time of the discharge process exceeds 30 minutes.

## 2019-01-28 NOTE — DISCHARGE INSTRUCTIONS
Discharge Instructions    Discharged to home by car with relative. Discharged via walking, hospital escort: Yes.  Special equipment needed: Not Applicable    Be sure to schedule a follow-up appointment with your primary care doctor or any specialists as instructed.     Discharge Plan:   Diet Plan: Discussed  Activity Level: Discussed  Confirmed Follow up Appointment: Appointment Scheduled  Confirmed Symptoms Management: Discussed  Medication Reconciliation Updated: Yes  Influenza Vaccine Indication: Patient Refuses    I understand that a diet low in cholesterol, fat, and sodium is recommended for good health. Unless I have been given specific instructions below for another diet, I accept this instruction as my diet prescription.   Other diet: heart healthy diet    Special Instructions: None    · Is patient discharged on Warfarin / Coumadin?   No   Chest Pain, Nonspecific  It is often hard to give a specific diagnosis for the cause of chest pain. There is always a chance that your pain could be related to something serious, like a heart attack or a blood clot in the lungs. You need to follow up with your caregiver for further evaluation. More lab tests or other studies such as X-rays, electrocardiography, stress testing, or cardiac imaging may be needed to find the cause of your pain.  Most of the time, nonspecific chest pain improves within 2 to 3 days with rest and mild pain medicine. For the next few days, avoid physical exertion or activities that bring on pain. Do not smoke. Avoid drinking alcohol. Call your caregiver for routine follow-up as advised.   SEEK IMMEDIATE MEDICAL CARE IF:  · You develop increased chest pain or pain that radiates to the arm, neck, jaw, back, or abdomen.   · You develop shortness of breath, increased coughing, or you start coughing up blood.   · You have severe back or abdominal pain, nausea, or vomiting.   · You develop severe weakness, fainting, fever, or chills.   Document  Released: 12/18/2006 Document Revised: 03/11/2013 Document Reviewed: 06/06/2008  ExitCare® Patient Information ©2013 [employer].    Depression / Suicide Risk    As you are discharged from this [facility] facility, it is important to learn how to keep safe from harming yourself.    Recognize the warning signs:  · Abrupt changes in personality, positive or negative- including increase in energy   · Giving away possessions  · Change in eating patterns- significant weight changes-  positive or negative  · Change in sleeping patterns- unable to sleep or sleeping all the time   · Unwillingness or inability to communicate  · Depression  · Unusual sadness, discouragement and loneliness  · Talk of wanting to die  · Neglect of personal appearance   · Rebelliousness- reckless behavior  · Withdrawal from people/activities they love  · Confusion- inability to concentrate     If you or a loved one observes any of these behaviors or has concerns about self-harm, here's what you can do:  · Talk about it- your feelings and reasons for harming yourself  · Remove any means that you might use to hurt yourself (examples: pills, rope, extension cords, firearm)  · Get professional help from the community (Mental Health, Substance Abuse, psychological counseling)  · Do not be alone:Call your Safe Contact- someone whom you trust who will be there for you.  · Call your local CRISIS HOTLINE 827-5702 or 629-953-2840  · Call your local Children's Mobile Crisis Response Team [place] (975) 599-3327 or www.Adreal  · Call the toll free National Suicide Prevention Hotlines   · National Suicide Prevention Lifeline 824-300-SLMO (2159)  · National Hope Line Network 800-SUICIDE (472-3563)

## 2019-01-28 NOTE — PROGRESS NOTES
IV removed. Discharge instructions provided and patient verbalizes understanding. Patient states that all question have been answered. Copy of discharge provided to patient and signed. Patient states that all personal items are in possess.

## 2019-08-05 ENCOUNTER — HOSPITAL ENCOUNTER (OUTPATIENT)
Dept: LAB | Facility: MEDICAL CENTER | Age: 59
End: 2019-08-05
Payer: COMMERCIAL

## 2019-08-05 LAB
BUN SERPL-MCNC: 16 MG/DL (ref 8–22)
CREAT SERPL-MCNC: 1.15 MG/DL (ref 0.5–1.4)

## 2019-08-05 PROCEDURE — 84520 ASSAY OF UREA NITROGEN: CPT

## 2019-08-05 PROCEDURE — 36415 COLL VENOUS BLD VENIPUNCTURE: CPT

## 2019-08-05 PROCEDURE — 82565 ASSAY OF CREATININE: CPT

## 2019-08-09 ENCOUNTER — APPOINTMENT (OUTPATIENT)
Dept: RADIOLOGY | Facility: MEDICAL CENTER | Age: 59
End: 2019-08-09
Attending: NURSE PRACTITIONER
Payer: COMMERCIAL

## 2019-08-09 DIAGNOSIS — C76.41: ICD-10-CM

## 2019-08-09 PROCEDURE — A9585 GADOBUTROL INJECTION: HCPCS | Performed by: ORTHOPAEDIC SURGERY

## 2019-08-09 PROCEDURE — 700117 HCHG RX CONTRAST REV CODE 255: Performed by: ORTHOPAEDIC SURGERY

## 2019-08-09 PROCEDURE — 73220 MRI UPPR EXTREMITY W/O&W/DYE: CPT | Mod: RT

## 2019-08-09 RX ORDER — GADOBUTROL 604.72 MG/ML
15 INJECTION INTRAVENOUS ONCE
Status: COMPLETED | OUTPATIENT
Start: 2019-08-09 | End: 2019-08-09

## 2019-08-09 RX ADMIN — GADOBUTROL 15 ML: 604.72 INJECTION INTRAVENOUS at 10:13

## 2019-09-27 ENCOUNTER — HOSPITAL ENCOUNTER (OUTPATIENT)
Dept: LAB | Facility: MEDICAL CENTER | Age: 59
End: 2019-09-27
Payer: COMMERCIAL

## 2019-10-01 ENCOUNTER — HOSPITAL ENCOUNTER (OUTPATIENT)
Facility: MEDICAL CENTER | Age: 59
End: 2019-10-01
Payer: COMMERCIAL

## 2019-10-04 ENCOUNTER — HOSPITAL ENCOUNTER (OUTPATIENT)
Dept: RADIOLOGY | Facility: MEDICAL CENTER | Age: 59
End: 2019-10-04
Attending: ORTHOPAEDIC SURGERY
Payer: COMMERCIAL

## 2019-10-04 DIAGNOSIS — C76.41: ICD-10-CM

## 2019-10-04 PROCEDURE — 71260 CT THORAX DX C+: CPT

## 2019-10-04 PROCEDURE — 700117 HCHG RX CONTRAST REV CODE 255: Performed by: RADIOLOGY

## 2019-10-04 RX ADMIN — IOHEXOL 75 ML: 350 INJECTION, SOLUTION INTRAVENOUS at 11:04

## 2019-12-31 ENCOUNTER — APPOINTMENT (OUTPATIENT)
Dept: PULMONOLOGY | Facility: MEDICAL CENTER | Age: 59
End: 2019-12-31
Payer: COMMERCIAL

## 2020-01-02 ENCOUNTER — HOSPITAL ENCOUNTER (OUTPATIENT)
Dept: PULMONOLOGY | Facility: MEDICAL CENTER | Age: 60
End: 2020-01-02
Payer: COMMERCIAL

## 2020-01-02 PROCEDURE — 94729 DIFFUSING CAPACITY: CPT

## 2020-01-02 PROCEDURE — 94060 EVALUATION OF WHEEZING: CPT

## 2020-01-02 PROCEDURE — 94726 PLETHYSMOGRAPHY LUNG VOLUMES: CPT

## 2020-01-02 ASSESSMENT — PULMONARY FUNCTION TESTS
FVC_LLN: 4.73
FEV1_PREDICTED: 4.31
FEV1/FVC_PERCENT_LLN: 63.86
FVC_LLN: 4.73
FEV1/FVC: 81
FVC_PREDICTED: 5.66
FEV1/FVC_PERCENT_PREDICTED: 105
FEV1/FVC: 81.05
FEV1/FVC_PERCENT_CHANGE: 0
FEV1/FVC_PERCENT_PREDICTED: 106
FVC: 5.63
FEV1/FVC_PERCENT_PREDICTED: 107
FEV1_PERCENT_PREDICTED: 105
FEV1: 4.54
FVC_PERCENT_PREDICTED: 99
FEV1_PERCENT_PREDICTED: 105
FEV1/FVC_PERCENT_LLN: 63.86
FVC: 5.6
FEV1/FVC_PERCENT_PREDICTED: 105
FEV1: 4.56
FEV1_LLN: 3.60
FEV1/FVC_PERCENT_PREDICTED: 76
FEV1/FVC: 71.00
FEV1/FVC: 80.99
FEV1/FVC_PREDICTED: 76.48
FEV1_PERCENT_CHANGE: 0
FEV1_LLN: 3.60
FEV1_PERCENT_CHANGE: 0
FVC_PERCENT_PREDICTED: 98

## 2020-01-05 PROCEDURE — 94729 DIFFUSING CAPACITY: CPT | Mod: 26 | Performed by: INTERNAL MEDICINE

## 2020-01-05 PROCEDURE — 94726 PLETHYSMOGRAPHY LUNG VOLUMES: CPT | Mod: 26 | Performed by: INTERNAL MEDICINE

## 2020-01-05 PROCEDURE — 94060 EVALUATION OF WHEEZING: CPT | Mod: 26 | Performed by: INTERNAL MEDICINE

## 2020-01-05 NOTE — PROCEDURES
DATE OF SERVICE:  01/02/2020    PULMONARY FUNCTION TEST INTERPRETATION    This spirometry is performed without a documented primary diagnosis to establish a pre-test probability of pathology.      The Flow Volume Loop is of sufficient quality and the volume-time graph demonstrates an adequate exhalation to provide a confident interpretation.    The FEV1/FVC ratio is normal by GOLD criteria and confidence interval data indicating an absence of irreversible obstructive lung disease (i.e. no evidence of COPD).  This is accompanied by a normal FEV1 and FVC based on predicted values. After administration of 4 puffs of albuterol (360mcg), the patient did not achieve a significant bronchodilator response (12%and 200 ml in FEV1 or FVC).     Total lung capacity is normal and the residual volume is reduced. The DLCO is significantly elevated (> 120% predicted).     Impression:   1. No evidence of COPD   2. Reduced RV and RV/TLC. Clinical correlation recommended.   3. The DLCO is significantly elevated (> 120% predicted). This may be seen with pulmonary hemorrhage syndromes, polycythemia, left-to-right shunts, or asthma.          ____________________________________     Hamilton Pastor MD    CT / NTS    DD:  01/04/2020 14:56:43  DT:  01/04/2020 16:50:42    D#:  [id number]  Job#:  [id number]

## 2020-02-27 ENCOUNTER — OFFICE VISIT (OUTPATIENT)
Dept: URGENT CARE | Facility: PHYSICIAN GROUP | Age: 60
End: 2020-02-27
Payer: COMMERCIAL

## 2020-02-27 VITALS
DIASTOLIC BLOOD PRESSURE: 76 MMHG | HEART RATE: 82 BPM | BODY MASS INDEX: 38.14 KG/M2 | OXYGEN SATURATION: 98 % | WEIGHT: 281.6 LBS | TEMPERATURE: 98.5 F | SYSTOLIC BLOOD PRESSURE: 122 MMHG | HEIGHT: 72 IN | RESPIRATION RATE: 16 BRPM

## 2020-02-27 DIAGNOSIS — T81.49XA SURGICAL SITE INFECTION: Primary | ICD-10-CM

## 2020-02-27 PROBLEM — Z98.890 STATUS POST EXCISIONAL BIOPSY: Status: ACTIVE | Noted: 2017-01-01

## 2020-02-27 PROBLEM — R91.1 PULMONARY NODULE: Status: ACTIVE | Noted: 2020-01-15

## 2020-02-27 PROBLEM — C76.41: Status: ACTIVE | Noted: 2017-07-06

## 2020-02-27 PROBLEM — C80.1 PAPILLARY ADENOCARCINOMA (HCC): Status: ACTIVE | Noted: 2019-12-05

## 2020-02-27 PROCEDURE — 99214 OFFICE O/P EST MOD 30 MIN: CPT | Performed by: PHYSICIAN ASSISTANT

## 2020-02-27 RX ORDER — TRAMADOL HYDROCHLORIDE 50 MG/1
TABLET ORAL
COMMUNITY
Start: 2020-01-22

## 2020-02-27 RX ORDER — CEPHALEXIN 500 MG/1
500 CAPSULE ORAL 4 TIMES DAILY
Qty: 28 CAP | Refills: 0 | Status: SHIPPED | OUTPATIENT
Start: 2020-02-27 | End: 2020-03-05

## 2020-02-27 RX ORDER — OXYCODONE HYDROCHLORIDE 5 MG/1
TABLET ORAL
COMMUNITY
Start: 2020-01-17

## 2020-02-27 RX ORDER — GABAPENTIN 100 MG/1
CAPSULE ORAL
COMMUNITY
Start: 2020-01-22

## 2020-02-28 NOTE — PROGRESS NOTES
Subjective:      Pt is a 59 y.o. male who presents with Wound Infection (Pt states he has lung surgery 2020 believes one of the incisions site may be infected )            HPI  This is a new problem. Pt notes left lung resection 1/15/20 at [facility] and states one of the incision sites under his left arm has become mildly opened with drainage and infection. Pt has not taken any Rx medications for this condition. Pt states the pain is a 3/10, aching in nature and worse at night. Pt denies CP, SOB, NVD, paresthesias, headaches, dizziness, change in vision, hives, or other joint pain. The pt's medication list, problem list, and allergies have been evaluated and reviewed during today's visit.    PMH:  Past Medical History:   Diagnosis Date   • Skin cancer        PSH:  Past Surgical History:   Procedure Laterality Date   • ANKLE ORIF         Fam Hx:    family history includes Dementia in his mother; Other in his father.  Family Status   Relation Name Status   • Mo     • Fa  Alive   • Son 1 Alive   • Aren 1 Alive       Soc HX:  Social History     Socioeconomic History   • Marital status: Single     Spouse name: Not on file   • Number of children: Not on file   • Years of education: Not on file   • Highest education level: Not on file   Occupational History   • Not on file   Social Needs   • Financial resource strain: Not on file   • Food insecurity     Worry: Not on file     Inability: Not on file   • Transportation needs     Medical: Not on file     Non-medical: Not on file   Tobacco Use   • Smoking status: Never Smoker   • Smokeless tobacco: Never Used   Substance and Sexual Activity   • Alcohol use: No   • Drug use: No   • Sexual activity: Yes     Partners: Female   Lifestyle   • Physical activity     Days per week: Not on file     Minutes per session: Not on file   • Stress: Not on file   Relationships   • Social connections     Talks on phone: Not on file     Gets together: Not on file     Attends [employer]  service: Not on file     Active member of club or organization: Not on file     Attends meetings of clubs or organizations: Not on file     Relationship status: Not on file   • Intimate partner violence     Fear of current or ex partner: Not on file     Emotionally abused: Not on file     Physically abused: Not on file     Forced sexual activity: Not on file   Other Topics Concern   • Not on file   Social History Narrative   • Not on file         Medications:    Current Outpatient Medications:   •  cephALEXin (KEFLEX) 500 MG Cap, Take 1 Cap by mouth 4 times a day for 7 days., Disp: 28 Cap, Rfl: 0      Allergies:  Patient has no known allergies.    ROS  Constitutional: Negative for fever, chills and malaise/fatigue.   HENT: Negative for congestion and sore throat.    Eyes: Negative for blurred vision, double vision and photophobia.   Respiratory: Negative for cough and shortness of breath.  Cardiovascular: Negative for chest pain and palpitations.   Gastrointestinal: Negative for heartburn, nausea, vomiting, abdominal pain, diarrhea and constipation.   Genitourinary: Negative for dysuria and flank pain.   Musculoskeletal: Negative for joint pain and myalgias.   Skin: +surgical site infection   Neurological: Negative for dizziness, tingling and headaches.   Endo/Heme/Allergies: Does not bruise/bleed easily.   Psychiatric/Behavioral: Negative for depression. The patient is not nervous/anxious.           Objective:     /76 (BP Location: Left arm, Patient Position: Sitting, BP Cuff Size: Adult)   Pulse 82   Temp 36.9 °C (98.5 °F) (Temporal)   Resp 16   Ht 1.829 m (6')   Wt (!) 127.7 kg (281 lb 9.6 oz)   SpO2 98%   BMI 38.19 kg/m²      Physical Exam  Skin:     General: Skin is warm.      Capillary Refill: Capillary refill takes less than 2 seconds.      Findings: Erythema present.              Constitutional: PT is oriented to person, place, and time. PT appears well-developed and well-nourished. No distress.    HENT:   Head: Normocephalic and atraumatic.   Mouth/Throat: Oropharynx is clear and moist. No oropharyngeal exudate.   Eyes: Conjunctivae normal and EOM are normal. Pupils are equal, round, and reactive to light.   Neck: Normal range of motion. Neck supple. No thyromegaly present.   Cardiovascular: Normal rate, regular rhythm, normal heart sounds and intact distal pulses.  Exam reveals no gallop and no friction rub.    No murmur heard.  Pulmonary/Chest: Effort normal and breath sounds normal. No respiratory distress. PT has no wheezes. PT has no rales. Pt exhibits no tenderness.   Abdominal: Soft. Bowel sounds are normal. PT exhibits no distension and no mass. There is no tenderness. There is no rebound and no guarding.   Musculoskeletal: Normal range of motion. PT exhibits no edema and no tenderness.   Neurological: PT is alert and oriented to person, place, and time. PT has normal reflexes. No cranial nerve deficit.        Psychiatric: PT has a normal mood and affect. PT behavior is normal. Judgment and thought content normal.             Assessment/Plan:       1. Surgical site infection    - cephALEXin (KEFLEX) 500 MG Cap; Take 1 Cap by mouth 4 times a day for 7 days.  Dispense: 28 Cap; Refill: 0    Incision site cleaned and dressed in clinic  Wound care discussed as well as dressing care  Rest, fluids encouraged.  AVS with medical info given.  Pt was in full understanding and agreement with the plan.  Differential diagnosis, natural history, supportive care, and indications for immediate follow-up discussed. All questions answered. Patient agrees with the plan of care.  Follow-up as needed if symptoms worsen or fail to improve to PCP, Urgent care or Emergency Room.

## 2021-03-15 DIAGNOSIS — Z23 NEED FOR VACCINATION: ICD-10-CM
